# Patient Record
Sex: MALE | Race: WHITE | NOT HISPANIC OR LATINO | Employment: FULL TIME | URBAN - METROPOLITAN AREA
[De-identification: names, ages, dates, MRNs, and addresses within clinical notes are randomized per-mention and may not be internally consistent; named-entity substitution may affect disease eponyms.]

---

## 2017-03-09 ENCOUNTER — HOSPITAL ENCOUNTER (EMERGENCY)
Facility: HOSPITAL | Age: 57
Discharge: HOME | End: 2017-03-09
Attending: EMERGENCY MEDICINE | Admitting: EMERGENCY MEDICINE

## 2017-03-09 ENCOUNTER — APPOINTMENT (OUTPATIENT)
Dept: CT IMAGING | Facility: HOSPITAL | Age: 57
End: 2017-03-09

## 2017-03-09 VITALS
HEIGHT: 69 IN | TEMPERATURE: 97 F | SYSTOLIC BLOOD PRESSURE: 157 MMHG | OXYGEN SATURATION: 95 % | DIASTOLIC BLOOD PRESSURE: 86 MMHG | RESPIRATION RATE: 16 BRPM | HEART RATE: 63 BPM | WEIGHT: 240 LBS | BODY MASS INDEX: 35.55 KG/M2

## 2017-03-09 DIAGNOSIS — M54.50 ACUTE BILATERAL LOW BACK PAIN WITHOUT SCIATICA: Primary | ICD-10-CM

## 2017-03-09 LAB
ALBUMIN SERPL-MCNC: 4.5 G/DL (ref 3.5–5.2)
ALBUMIN/GLOB SERPL: 1.5 G/DL
ALP SERPL-CCNC: 58 U/L (ref 39–117)
ALT SERPL W P-5'-P-CCNC: 33 U/L (ref 1–41)
ANION GAP SERPL CALCULATED.3IONS-SCNC: 13.3 MMOL/L
AST SERPL-CCNC: 22 U/L (ref 1–40)
BASOPHILS # BLD AUTO: 0.04 10*3/MM3 (ref 0–0.2)
BASOPHILS NFR BLD AUTO: 0.7 % (ref 0–1.5)
BILIRUB SERPL-MCNC: 0.4 MG/DL (ref 0.1–1.2)
BILIRUB UR QL STRIP: NEGATIVE
BUN BLD-MCNC: 19 MG/DL (ref 6–20)
BUN/CREAT SERPL: 17.6 (ref 7–25)
CALCIUM SPEC-SCNC: 9.7 MG/DL (ref 8.6–10.5)
CHLORIDE SERPL-SCNC: 104 MMOL/L (ref 98–107)
CLARITY UR: CLEAR
CO2 SERPL-SCNC: 26.7 MMOL/L (ref 22–29)
COLOR UR: YELLOW
CREAT BLD-MCNC: 1.08 MG/DL (ref 0.76–1.27)
DEPRECATED RDW RBC AUTO: 43.8 FL (ref 37–54)
EOSINOPHIL # BLD AUTO: 0.21 10*3/MM3 (ref 0–0.7)
EOSINOPHIL NFR BLD AUTO: 3.7 % (ref 0.3–6.2)
ERYTHROCYTE [DISTWIDTH] IN BLOOD BY AUTOMATED COUNT: 13.9 % (ref 11.5–14.5)
GFR SERPL CREATININE-BSD FRML MDRD: 71 ML/MIN/1.73
GLOBULIN UR ELPH-MCNC: 3 GM/DL
GLUCOSE BLD-MCNC: 83 MG/DL (ref 65–99)
GLUCOSE UR STRIP-MCNC: NEGATIVE MG/DL
HCT VFR BLD AUTO: 46.3 % (ref 40.4–52.2)
HGB BLD-MCNC: 15.7 G/DL (ref 13.7–17.6)
HGB UR QL STRIP.AUTO: NEGATIVE
HOLD SPECIMEN: NORMAL
IMM GRANULOCYTES # BLD: 0 10*3/MM3 (ref 0–0.03)
IMM GRANULOCYTES NFR BLD: 0 % (ref 0–0.5)
KETONES UR QL STRIP: NEGATIVE
LEUKOCYTE ESTERASE UR QL STRIP.AUTO: NEGATIVE
LIPASE SERPL-CCNC: 27 U/L (ref 13–60)
LYMPHOCYTES # BLD AUTO: 1.57 10*3/MM3 (ref 0.9–4.8)
LYMPHOCYTES NFR BLD AUTO: 27.6 % (ref 19.6–45.3)
MCH RBC QN AUTO: 29.3 PG (ref 27–32.7)
MCHC RBC AUTO-ENTMCNC: 33.9 G/DL (ref 32.6–36.4)
MCV RBC AUTO: 86.5 FL (ref 79.8–96.2)
MONOCYTES # BLD AUTO: 0.43 10*3/MM3 (ref 0.2–1.2)
MONOCYTES NFR BLD AUTO: 7.6 % (ref 5–12)
NEUTROPHILS # BLD AUTO: 3.44 10*3/MM3 (ref 1.9–8.1)
NEUTROPHILS NFR BLD AUTO: 60.4 % (ref 42.7–76)
NITRITE UR QL STRIP: NEGATIVE
PH UR STRIP.AUTO: 5.5 [PH] (ref 5–8)
PLATELET # BLD AUTO: 167 10*3/MM3 (ref 140–500)
PMV BLD AUTO: 11.7 FL (ref 6–12)
POTASSIUM BLD-SCNC: 4.1 MMOL/L (ref 3.5–5.2)
PROT SERPL-MCNC: 7.5 G/DL (ref 6–8.5)
PROT UR QL STRIP: NEGATIVE
RBC # BLD AUTO: 5.35 10*6/MM3 (ref 4.6–6)
SODIUM BLD-SCNC: 144 MMOL/L (ref 136–145)
SP GR UR STRIP: 1.03 (ref 1–1.03)
UROBILINOGEN UR QL STRIP: NORMAL
WBC NRBC COR # BLD: 5.69 10*3/MM3 (ref 4.5–10.7)
WHOLE BLOOD HOLD SPECIMEN: NORMAL

## 2017-03-09 PROCEDURE — 36415 COLL VENOUS BLD VENIPUNCTURE: CPT | Performed by: EMERGENCY MEDICINE

## 2017-03-09 PROCEDURE — 99283 EMERGENCY DEPT VISIT LOW MDM: CPT

## 2017-03-09 PROCEDURE — 80053 COMPREHEN METABOLIC PANEL: CPT | Performed by: EMERGENCY MEDICINE

## 2017-03-09 PROCEDURE — 74176 CT ABD & PELVIS W/O CONTRAST: CPT

## 2017-03-09 PROCEDURE — 83690 ASSAY OF LIPASE: CPT | Performed by: EMERGENCY MEDICINE

## 2017-03-09 PROCEDURE — 81003 URINALYSIS AUTO W/O SCOPE: CPT | Performed by: EMERGENCY MEDICINE

## 2017-03-09 PROCEDURE — 85025 COMPLETE CBC W/AUTO DIFF WBC: CPT | Performed by: EMERGENCY MEDICINE

## 2017-03-09 RX ORDER — IBUPROFEN 600 MG/1
600 TABLET ORAL EVERY 8 HOURS PRN
Qty: 30 TABLET | Refills: 0 | Status: SHIPPED | OUTPATIENT
Start: 2017-03-09

## 2017-03-09 RX ORDER — TESTOSTERONE 20.25 MG/1.25G
GEL TOPICAL
COMMUNITY

## 2017-03-09 RX ORDER — SODIUM CHLORIDE 0.9 % (FLUSH) 0.9 %
10 SYRINGE (ML) INJECTION AS NEEDED
Status: DISCONTINUED | OUTPATIENT
Start: 2017-03-09 | End: 2017-03-09 | Stop reason: HOSPADM

## 2017-03-09 NOTE — ED PROVIDER NOTES
EMERGENCY DEPARTMENT ENCOUNTER    CHIEF COMPLAINT  Chief Complaint: flank pain  History given by: patient  History limited by: nothing  Room Number: 21/21  PMD: Amor Gonzalez MD      HPI:  Pt is a 56 y.o. male who presents complaining of dull, intermittent bilateral flank pain x1 month that occasional radiates into his abdomen. Pt states the episodes of pain last ~30 minutes and there is nothing that worsens or improves his pain. Pt also c/o fatigue and nausea. Pt states he was seen at urgent care p[facility] and was advised to come to the ED for further evaluation. Pt denies fever, chills, dysuria, hematuria, and unexpected weight loss.     Duration:  1 month  Onset: gradual  Timing: intermittent  Location: bilateral flanks  Radiation: abdomen  Quality: dull  Intensity/Severity: moderate  Progression: unchanged  Associated Symptoms: fatigue, abdominal pain, nausea  Aggravating Factors: none  Alleviating Factors: none  Previous Episodes: none  Treatment before arrival: none    PAST MEDICAL HISTORY  Active Ambulatory Problems     Diagnosis Date Noted   • No Active Ambulatory Problems     Resolved Ambulatory Problems     Diagnosis Date Noted   • No Resolved Ambulatory Problems     Past Medical History   Diagnosis Date   • Hyperlipidemia    • Hypertension        PAST SURGICAL HISTORY  History reviewed. No pertinent past surgical history.    FAMILY HISTORY  History reviewed. No pertinent family history.    SOCIAL HISTORY  Social History     Social History   • Marital status:      Spouse name: N/A   • Number of children: N/A   • Years of education: N/A     Occupational History   • Not on file.     Social History Main Topics   • Smoking status: Never Smoker   • Smokeless tobacco: Not on file   • Alcohol use Yes      Comment: occasionally   • Drug use: Not on file   • Sexual activity: Not on file     Other Topics Concern   • Not on file     Social History Narrative       ALLERGIES  Review of patient's allergies  indicates no known allergies.    REVIEW OF SYSTEMS  Review of Systems   Constitutional: Positive for fatigue. Negative for chills and fever.   HENT: Negative for sore throat and trouble swallowing.    Eyes: Negative for visual disturbance.   Respiratory: Negative for cough and shortness of breath.    Cardiovascular: Negative for chest pain and leg swelling.   Gastrointestinal: Positive for abdominal pain (radiating from flank) and nausea. Negative for diarrhea and vomiting.   Endocrine: Negative.    Genitourinary: Positive for flank pain. Negative for decreased urine volume and frequency.   Musculoskeletal: Negative for neck pain.   Skin: Negative for rash.   Allergic/Immunologic: Negative.    Neurological: Negative for weakness and numbness.   Hematological: Negative.    Psychiatric/Behavioral: Negative.    All other systems reviewed and are negative.      PHYSICAL EXAM  ED Triage Vitals   Temp Heart Rate Resp BP SpO2   03/09/17 1017 03/09/17 1017 03/09/17 1017 03/09/17 1017 03/09/17 1017   97 °F (36.1 °C) 74 16 163/94 95 %      Temp src Heart Rate Source Patient Position BP Location FiO2 (%)   03/09/17 1017 03/09/17 1017 03/09/17 1017 03/09/17 1017 --   Tympanic Monitor Sitting Right arm        Physical Exam   Constitutional: He is oriented to person, place, and time and well-developed, well-nourished, and in no distress.   HENT:   Head: Normocephalic and atraumatic.   Eyes: EOM are normal. Pupils are equal, round, and reactive to light.   Neck: Normal range of motion. Neck supple.   Cardiovascular: Normal rate, regular rhythm and normal heart sounds.    Pulmonary/Chest: Effort normal and breath sounds normal. No respiratory distress.   Abdominal: Soft. Bowel sounds are normal. There is no tenderness. There is no rebound and no guarding.   Musculoskeletal: Normal range of motion. He exhibits no edema.   No spinal tenderness    Neurological: He is alert and oriented to person, place, and time. He has normal  sensation and normal strength. He has a normal Straight Leg Raise Test.   Reflex Scores:       Patellar reflexes are 2+ on the right side and 2+ on the left side.  Skin: Skin is warm and dry.   Psychiatric: Mood and affect normal.   Nursing note and vitals reviewed.      LAB RESULTS  Lab Results (last 24 hours)     Procedure Component Value Units Date/Time    POCT Urinalysis (manual dipstick) [[id number]]  (Abnormal) Collected:  03/09/17 0858    Specimen:  Urine Updated:  03/09/17 0903     Color Yellow      Clarity, UA Clear      Glucose,  mg/dL (A) mg/dL      Bilirubin Negative      Ketones, UA Negative      Specific Gravity  1.030      Blood, UA Negative      pH, Urine 5.0      Protein, POC Trace (A) mg/dL      Urobilinogen, UA Normal      Leukocytes Negative      Nitrite, UA Negative     CBC & Differential [[id number]] Collected:  03/09/17 1109    Specimen:  Blood Updated:  03/09/17 1124    Narrative:       The following orders were created for panel order CBC & Differential.  Procedure                               Abnormality         Status                     ---------                               -----------         ------                     CBC Auto Differential[[id number]]         Normal              Final result                 Please view results for these tests on the individual orders.    Comprehensive Metabolic Panel [[id number]] Collected:  03/09/17 1109    Specimen:  Blood Updated:  03/09/17 1143     Glucose 83 mg/dL      BUN 19 mg/dL      Creatinine 1.08 mg/dL      Sodium 144 mmol/L      Potassium 4.1 mmol/L      Chloride 104 mmol/L      CO2 26.7 mmol/L      Calcium 9.7 mg/dL      Total Protein 7.5 g/dL      Albumin 4.50 g/dL      ALT (SGPT) 33 U/L      AST (SGOT) 22 U/L      Alkaline Phosphatase 58 U/L      Total Bilirubin 0.4 mg/dL      eGFR Non African Amer 71 mL/min/1.73      Globulin 3.0 gm/dL      A/G Ratio 1.5 g/dL      BUN/Creatinine Ratio 17.6      Anion Gap 13.3 mmol/L     Lipase  [[id number]]  (Normal) Collected:  03/09/17 1109    Specimen:  Blood Updated:  03/09/17 1143     Lipase 27 U/L     CBC Auto Differential [[id number]]  (Normal) Collected:  03/09/17 1109    Specimen:  Blood Updated:  03/09/17 1124     WBC 5.69 10*3/mm3      RBC 5.35 10*6/mm3      Hemoglobin 15.7 g/dL      Hematocrit 46.3 %      MCV 86.5 fL      MCH 29.3 pg      MCHC 33.9 g/dL      RDW 13.9 %      RDW-SD 43.8 fl      MPV 11.7 fL      Platelets 167 10*3/mm3      Neutrophil % 60.4 %      Lymphocyte % 27.6 %      Monocyte % 7.6 %      Eosinophil % 3.7 %      Basophil % 0.7 %      Immature Grans % 0.0 %      Neutrophils, Absolute 3.44 10*3/mm3      Lymphocytes, Absolute 1.57 10*3/mm3      Monocytes, Absolute 0.43 10*3/mm3      Eosinophils, Absolute 0.21 10*3/mm3      Basophils, Absolute 0.04 10*3/mm3      Immature Grans, Absolute 0.00 10*3/mm3     Urinalysis With / Culture If Indicated [[id number]]  (Normal) Collected:  03/09/17 1137    Specimen:  Urine from Urine, Clean Catch Updated:  03/09/17 1202     Color, UA Yellow      Appearance, UA Clear      pH, UA 5.5      Specific Gravity, UA 1.029      Glucose, UA Negative      Ketones, UA Negative      Bilirubin, UA Negative      Blood, UA Negative      Protein, UA Negative      Leuk Esterase, UA Negative      Nitrite, UA Negative      Urobilinogen, UA 0.2 E.U./dL     Narrative:       Urine microscopic not indicated.      I ordered the above labs and reviewed the results    RADIOLOGY  CT Abdomen Pelvis Without Contrast   Final Result   No nephroureterolithiasis or hydronephrosis bilaterally.   There is equivocal haziness adjacent to the bladder base. Please   correlate clinically for acute cystitis.       Discussed with Dr. Mackey.       This report was finalized on 3/9/2017 2:48 PM by Dr. Dunia Smith MD.          I ordered the above noted radiological studies. Interpreted by radiologist. Discussed with radiologist. Reviewed by me in PACS.       PROCEDURES  Procedures      PROGRESS  AND CONSULTS  ED Course     11:37 AM  Ordered CT abd/pelvis for further evaluation.     12:15 PM  Pt rechecked and is resting comfortably. All pertinent lab/imaging/EKG findings discussed including normal labs and nothing acute seen on CT. Pt/family verbalized understanding and agree with plan to discharge with pain medication. All questions and concerns addressed at this time.       MEDICAL DECISION MAKING  Results were reviewed/discussed with the patient and they were also made aware of online access. Pt also made aware that some labs, such as cultures, will not be resulted during ER visit and follow up with PMD is necessary.     MDM  Number of Diagnoses or Management Options     Amount and/or Complexity of Data Reviewed  Clinical lab tests: ordered and reviewed (UA: Negative)  Tests in the radiology section of CPT®: ordered and reviewed (CT abd/pelvis: No stone. Negative acute)  Discussion of test results with the performing providers: yes  Independent visualization of images, tracings, or specimens: yes    Patient Progress  Patient progress: stable         DIAGNOSIS  Final diagnoses:   Acute bilateral low back pain without sciatica       DISPOSITION  Disposition: Discharged.    Patient discharged in stable condition.    Reviewed implications of results, diagnosis, meds, responsibility to follow up, warning signs and symptoms of possible worsening, potential complications and reasons to return to ER.    Patient/Family voiced understanding of above instructions.    Discussed plan for discharge, as there is no emergent indication for admission.  Pt/family is agreeable and understands need for follow up and repeat testing.  Pt is aware that discharge does not mean that nothing is wrong but it indicates no emergency is present and they must continue care with follow-up as given below or physician of their choice.     FOLLOW-UP  Amor Gonzalez MD  [address]  [address]  [phone]    Schedule an appointment as soon as possible for a visit         Medication List      New Prescriptions          ibuprofen 600 MG tablet   Commonly known as:  ADVIL,MOTRIN   Take 1 tablet by mouth Every 8 (Eight) Hours As Needed for Mild Pain   (1-3).         Changed          albuterol 108 (90 BASE) MCG/ACT inhaler   Commonly known as:  PROVENTIL HFA;VENTOLIN HFA   Inhale 2 puffs Every 4 (Four) Hours As Needed for wheezing.   What changed:  when to take this         Stop          azithromycin 250 MG tablet   Commonly known as:  ZITHROMAX       NON FORMULARY       predniSONE 50 MG tablet   Commonly known as:  DELTASONE             Latest Documented Vital Signs:  As of 3:41 PM  BP- 157/86 HR- 63 Temp- 97 °F (36.1 °C) (Tympanic) O2 sat- 95%    --  Documentation assistance provided by estefani Watt for Dr. Key MD.  Information recorded by the scribe was done at my direction and has been verified and validated by me.          Zoraida Watt  03/09/17 3181       Car Mackey MD  03/09/17 3085

## 2017-03-09 NOTE — ED NOTES
Patient states for about 2 months has had left and right flank pain, right abdominal pain, and fatigue, sent here from [facility] for blood work and scans.      Katerin Jc RN  03/09/17 1015

## 2019-11-21 ENCOUNTER — TELEPHONE (OUTPATIENT)
Dept: URGENT CARE | Facility: CLINIC | Age: 59
End: 2019-11-21

## 2019-11-21 NOTE — TELEPHONE ENCOUNTER
Spoke to pt. Explained CXR read result. Pt understands instructions to f/u with PCP to discuss and have repeat CXR in 4-6 mos per CC, PA. Arnie

## 2020-01-20 ENCOUNTER — APPOINTMENT (OUTPATIENT)
Dept: GENERAL RADIOLOGY | Facility: HOSPITAL | Age: 60
End: 2020-01-20

## 2020-01-20 ENCOUNTER — APPOINTMENT (OUTPATIENT)
Dept: CT IMAGING | Facility: HOSPITAL | Age: 60
End: 2020-01-20

## 2020-01-20 ENCOUNTER — HOSPITAL ENCOUNTER (EMERGENCY)
Facility: HOSPITAL | Age: 60
Discharge: HOME | End: 2020-01-20
Attending: EMERGENCY MEDICINE | Admitting: EMERGENCY MEDICINE

## 2020-01-20 VITALS
BODY MASS INDEX: 37.89 KG/M2 | HEART RATE: 70 BPM | WEIGHT: 250 LBS | TEMPERATURE: 98.4 F | SYSTOLIC BLOOD PRESSURE: 169 MMHG | RESPIRATION RATE: 16 BRPM | DIASTOLIC BLOOD PRESSURE: 97 MMHG | OXYGEN SATURATION: 94 % | HEIGHT: 68 IN

## 2020-01-20 DIAGNOSIS — R55 SYNCOPE AND COLLAPSE: ICD-10-CM

## 2020-01-20 DIAGNOSIS — R05.9 COUGH: Primary | ICD-10-CM

## 2020-01-20 LAB
ALBUMIN SERPL-MCNC: 4.2 G/DL (ref 3.5–5.2)
ALBUMIN/GLOB SERPL: 1.9 G/DL
ALP SERPL-CCNC: 43 U/L (ref 39–117)
ALT SERPL W P-5'-P-CCNC: 28 U/L (ref 1–41)
ANION GAP SERPL CALCULATED.3IONS-SCNC: 11.5 MMOL/L (ref 5–15)
AST SERPL-CCNC: 17 U/L (ref 1–40)
BASOPHILS # BLD AUTO: 0.07 10*3/MM3 (ref 0–0.2)
BASOPHILS NFR BLD AUTO: 0.8 % (ref 0–1.5)
BILIRUB SERPL-MCNC: 0.3 MG/DL (ref 0.2–1.2)
BUN BLD-MCNC: 22 MG/DL (ref 6–20)
BUN/CREAT SERPL: 20.2 (ref 7–25)
CALCIUM SPEC-SCNC: 9.2 MG/DL (ref 8.6–10.5)
CHLORIDE SERPL-SCNC: 104 MMOL/L (ref 98–107)
CO2 SERPL-SCNC: 30.5 MMOL/L (ref 22–29)
CREAT BLD-MCNC: 1.09 MG/DL (ref 0.76–1.27)
D DIMER PPP FEU-MCNC: 0.31 MCGFEU/ML (ref 0–0.49)
DEPRECATED RDW RBC AUTO: 46.9 FL (ref 37–54)
EOSINOPHIL # BLD AUTO: 0.14 10*3/MM3 (ref 0–0.4)
EOSINOPHIL NFR BLD AUTO: 1.6 % (ref 0.3–6.2)
ERYTHROCYTE [DISTWIDTH] IN BLOOD BY AUTOMATED COUNT: 14.6 % (ref 12.3–15.4)
GFR SERPL CREATININE-BSD FRML MDRD: 70 ML/MIN/1.73
GLOBULIN UR ELPH-MCNC: 2.2 GM/DL
GLUCOSE BLD-MCNC: 105 MG/DL (ref 65–99)
HCT VFR BLD AUTO: 41.6 % (ref 37.5–51)
HGB BLD-MCNC: 13.7 G/DL (ref 13–17.7)
IMM GRANULOCYTES # BLD AUTO: 0.14 10*3/MM3 (ref 0–0.05)
IMM GRANULOCYTES NFR BLD AUTO: 1.6 % (ref 0–0.5)
LYMPHOCYTES # BLD AUTO: 2 10*3/MM3 (ref 0.7–3.1)
LYMPHOCYTES NFR BLD AUTO: 22.5 % (ref 19.6–45.3)
MCH RBC QN AUTO: 28.5 PG (ref 26.6–33)
MCHC RBC AUTO-ENTMCNC: 32.9 G/DL (ref 31.5–35.7)
MCV RBC AUTO: 86.7 FL (ref 79–97)
MONOCYTES # BLD AUTO: 0.75 10*3/MM3 (ref 0.1–0.9)
MONOCYTES NFR BLD AUTO: 8.4 % (ref 5–12)
NEUTROPHILS # BLD AUTO: 5.78 10*3/MM3 (ref 1.7–7)
NEUTROPHILS NFR BLD AUTO: 65.1 % (ref 42.7–76)
NRBC BLD AUTO-RTO: 0.1 /100 WBC (ref 0–0.2)
NT-PROBNP SERPL-MCNC: 97.9 PG/ML (ref 5–900)
PLATELET # BLD AUTO: 180 10*3/MM3 (ref 140–450)
PMV BLD AUTO: 11.2 FL (ref 6–12)
POTASSIUM BLD-SCNC: 3.9 MMOL/L (ref 3.5–5.2)
PROT SERPL-MCNC: 6.4 G/DL (ref 6–8.5)
RBC # BLD AUTO: 4.8 10*6/MM3 (ref 4.14–5.8)
SODIUM BLD-SCNC: 146 MMOL/L (ref 136–145)
TROPONIN T SERPL-MCNC: <0.01 NG/ML (ref 0–0.03)
WBC NRBC COR # BLD: 8.88 10*3/MM3 (ref 3.4–10.8)

## 2020-01-20 PROCEDURE — 80053 COMPREHEN METABOLIC PANEL: CPT | Performed by: NURSE PRACTITIONER

## 2020-01-20 PROCEDURE — 84484 ASSAY OF TROPONIN QUANT: CPT | Performed by: NURSE PRACTITIONER

## 2020-01-20 PROCEDURE — 93005 ELECTROCARDIOGRAM TRACING: CPT | Performed by: NURSE PRACTITIONER

## 2020-01-20 PROCEDURE — 71275 CT ANGIOGRAPHY CHEST: CPT

## 2020-01-20 PROCEDURE — 99283 EMERGENCY DEPT VISIT LOW MDM: CPT

## 2020-01-20 PROCEDURE — 93010 ELECTROCARDIOGRAM REPORT: CPT | Performed by: INTERNAL MEDICINE

## 2020-01-20 PROCEDURE — 94640 AIRWAY INHALATION TREATMENT: CPT

## 2020-01-20 PROCEDURE — 71045 X-RAY EXAM CHEST 1 VIEW: CPT

## 2020-01-20 PROCEDURE — 0 IOPAMIDOL PER 1 ML: Performed by: EMERGENCY MEDICINE

## 2020-01-20 PROCEDURE — 83880 ASSAY OF NATRIURETIC PEPTIDE: CPT | Performed by: NURSE PRACTITIONER

## 2020-01-20 PROCEDURE — 85379 FIBRIN DEGRADATION QUANT: CPT | Performed by: NURSE PRACTITIONER

## 2020-01-20 PROCEDURE — 85025 COMPLETE CBC W/AUTO DIFF WBC: CPT | Performed by: NURSE PRACTITIONER

## 2020-01-20 PROCEDURE — 94799 UNLISTED PULMONARY SVC/PX: CPT

## 2020-01-20 PROCEDURE — 70450 CT HEAD/BRAIN W/O DYE: CPT

## 2020-01-20 RX ORDER — DEXTROMETHORPHAN HYDROBROMIDE AND PROMETHAZINE HYDROCHLORIDE 15; 6.25 MG/5ML; MG/5ML
5 SYRUP ORAL 4 TIMES DAILY PRN
Qty: 118 ML | Refills: 0 | OUTPATIENT
Start: 2020-01-20 | End: 2021-12-13

## 2020-01-20 RX ORDER — IPRATROPIUM BROMIDE AND ALBUTEROL SULFATE 2.5; .5 MG/3ML; MG/3ML
3 SOLUTION RESPIRATORY (INHALATION) ONCE
Status: COMPLETED | OUTPATIENT
Start: 2020-01-20 | End: 2020-01-20

## 2020-01-20 RX ORDER — PREDNISONE 10 MG/1
10 TABLET ORAL DAILY
COMMUNITY
End: 2021-12-13

## 2020-01-20 RX ORDER — LEVOFLOXACIN 500 MG/1
500 TABLET, FILM COATED ORAL DAILY
COMMUNITY
End: 2021-12-13

## 2020-01-20 RX ORDER — BUDESONIDE AND FORMOTEROL FUMARATE DIHYDRATE 80; 4.5 UG/1; UG/1
2 AEROSOL RESPIRATORY (INHALATION)
COMMUNITY

## 2020-01-20 RX ORDER — SODIUM CHLORIDE 0.9 % (FLUSH) 0.9 %
10 SYRINGE (ML) INJECTION AS NEEDED
Status: DISCONTINUED | OUTPATIENT
Start: 2020-01-20 | End: 2020-01-20 | Stop reason: HOSPADM

## 2020-01-20 RX ADMIN — IPRATROPIUM BROMIDE AND ALBUTEROL SULFATE 3 ML: 2.5; .5 SOLUTION RESPIRATORY (INHALATION) at 12:49

## 2020-01-20 RX ADMIN — IOPAMIDOL 95 ML: 755 INJECTION, SOLUTION INTRAVENOUS at 14:10

## 2020-01-20 RX ADMIN — SODIUM CHLORIDE 1000 ML: 9 INJECTION, SOLUTION INTRAVENOUS at 12:37

## 2020-01-20 NOTE — ED PROVIDER NOTES
Pt presents to the ED c/o cough that began 3-4 months ago. Pt is also c/o 3 episodes of syncope while coughing over the past month. He reports last night he had a mild cough and passed out, causing him to hit his head on kitchen cabinet. Pt denies headache, nausea, vomiting, numbness and BLE swelling. He reports being seen 4 x total (2 ICC and 2 PCP) for cough and was diagnosed with bronchitis. He reports using nebulizer machine at home with minimal improvement. He denies hx of chronic lung disease and is not followed by pulmonology. Pt denies smoking.      On exam,   Cardiac- heart regular rate and rhythm without murmu  Respiratory- rhonchi and wheezes in upper L lung field  HENT- oropharynx normal   GI- abdomen soft, non-tender, non-distended with normal active bowel sounds  MSK- no calf tenderness or BLE edema, C-spine non-tender with full ROM       Plan: I agree with the plan to obtain ct head, cta chest and xr chest for further evaluation.       EKG          EKG time: 1234  Rhythm/Rate: Sinus rhythm, rate 74  P waves and MO: Nml  QRS, axis: Nml   ST and T waves: Nml     Interpreted Contemporaneously by me, independently viewed  No previous EKG for comparison      Attestation:  The [facility] and I have discussed this patient's history, physical exam, and treatment plan.  I have reviewed the documentation and personally had a face to face interaction with the patient. I affirm the documentation and agree with the treatment and plan.  The attached note describes my personal findings.       Documentation assistance provided by estefani Mcmillan for .  Information recorded by the estefani was done at my direction and has been verified and validated by me.              Tavia Mcmillan  01/20/20 1411       Car Mackey MD  01/20/20 7438

## 2020-01-20 NOTE — DISCHARGE INSTRUCTIONS
-Home to Rest. Take phenergam DM cough syrup as needed. Follow up with pulmonology Dr Ashlie dupont further evaluation    -Follow up with Amor Gonzalez -[phone] , call to make an appointment for ED follow up and further evaluation.     -Return to the ER for any worsening of your symptoms, fever, chest pain, dizziness, headache, weakness, fever, any new or other concerning symptoms.     We encourage all patients to follow up with your primary care provider for blood pressure recheck.  If you are a smoker, work on decreasing and stopping tobacco use. Follow up with your PCP to discuss medications that may assist in tobacco cessation if interested.

## 2020-01-20 NOTE — ED PROVIDER NOTES
EMERGENCY DEPARTMENT ENCOUNTER    Room Number:  41/41  Date seen:  1/20/2020  Time seen: 12:18 PM  PCP: Amor Gonzalez MD    HPI:  Chief complaint: Cough  Context:Rodrigo Vaughan is a 59 y.o. male who presents to the ED with c/o cough.  Reports he has a cough for 3 to 4 months.  He has been to the urgent care multiple times, he has been on Zithromax and Levaquin with no improvement.  He has had steroids as well with no improvement.  He has been diagnosed with bronchitis multiple times.  He was just seen by his primary care on Friday, he reports they were going to order a CAT scan for further evaluation, however the order did not go through.  He has started to pass out due to the coughing.  He has 3 episodes of syncope after coughing, most recent being last night.  He reports he is coughing very hard and then he just blackouts.  He did hit his head on a cabinet.  Denies any headache or head pain now.  Denies any chest pain or shortness of breath.  He does sometimes have yellow productive sputum, however denies any hemoptysis.  Denies any history of asthma, COPD, heart failure, MI, DVT/PE. Pt denies DVT/PE/emboli risk factors such as recent surgery, hormone therapy, smoking, cancer, personal or family history of DVT/PE, long periods of being sedentary or recent long travel.    MEDICAL RECORD REVIEW  Chest x-ray on 11/20/2019 shows tiny nodular density to the left lung  Urgent care visits on 10/23, 11/20 bronchitis      ALLERGIES  Patient has no known allergies.    PAST MEDICAL HISTORY  Active Ambulatory Problems     Diagnosis Date Noted   • No Active Ambulatory Problems     Resolved Ambulatory Problems     Diagnosis Date Noted   • No Resolved Ambulatory Problems     Past Medical History:   Diagnosis Date   • Hyperlipidemia    • Hypertension        PAST SURGICAL HISTORY  No past surgical history on file.    FAMILY HISTORY  No family history on file.    SOCIAL HISTORY  Social History     Socioeconomic History   •  Marital status:      Spouse name: Not on file   • Number of children: Not on file   • Years of education: Not on file   • Highest education level: Not on file   Tobacco Use   • Smoking status: Never Smoker   Substance and Sexual Activity   • Alcohol use: Yes     Comment: occasionally       REVIEW OF SYSTEMS  Review of Systems   Constitutional: Negative for chills and fever.   HENT: Negative.    Eyes: Negative.    Respiratory: Positive for cough and wheezing. Negative for shortness of breath.    Cardiovascular: Negative for chest pain.   Gastrointestinal: Negative for abdominal pain and vomiting.   Genitourinary: Negative for dysuria and hematuria.   Musculoskeletal: Negative.    Skin: Negative for rash.   Neurological: Positive for syncope. Negative for headaches.   Psychiatric/Behavioral: Negative.  Negative for confusion.       PHYSICAL EXAM  ED Triage Vitals   Temp Heart Rate Resp BP SpO2   01/20/20 1125 01/20/20 1125 01/20/20 1144 01/20/20 1144 01/20/20 1125   98.4 °F (36.9 °C) 92 18 (!) 173/106 93 %      Temp src Heart Rate Source Patient Position BP Location FiO2 (%)   01/20/20 1125 01/20/20 1144 01/20/20 1144 01/20/20 1144 --   Tympanic Monitor Lying Right arm      Physical Exam   Constitutional: He is oriented to person, place, and time and well-developed, well-nourished, and in no distress. No distress.   HENT:   Head: Normocephalic and atraumatic.   Mouth/Throat: Mucous membranes are normal.   Eyes: Pupils are equal, round, and reactive to light.   Neck: Normal range of motion. Neck supple.   Cardiovascular: Normal rate, regular rhythm and normal heart sounds.   Pulmonary/Chest: Effort normal. No respiratory distress. He has wheezes (faint) in the left middle field and the left lower field.   Abdominal: Soft. There is no tenderness. There is no rebound and no guarding.   Musculoskeletal:   No BLE edema or calf tenderness   Neurological: He is alert and oriented to person, place, and time. He has  normal strength.   Skin: Skin is warm, dry and intact.   Psychiatric: Mood, affect and judgment normal.   Nursing note and vitals reviewed.      LAB RESULTS  Recent Results (from the past 24 hour(s))   Comprehensive Metabolic Panel    Collection Time: 01/20/20 12:34 PM   Result Value Ref Range    Glucose 105 (H) 65 - 99 mg/dL    BUN 22 (H) 6 - 20 mg/dL    Creatinine 1.09 0.76 - 1.27 mg/dL    Sodium 146 (H) 136 - 145 mmol/L    Potassium 3.9 3.5 - 5.2 mmol/L    Chloride 104 98 - 107 mmol/L    CO2 30.5 (H) 22.0 - 29.0 mmol/L    Calcium 9.2 8.6 - 10.5 mg/dL    Total Protein 6.4 6.0 - 8.5 g/dL    Albumin 4.20 3.50 - 5.20 g/dL    ALT (SGPT) 28 1 - 41 U/L    AST (SGOT) 17 1 - 40 U/L    Alkaline Phosphatase 43 39 - 117 U/L    Total Bilirubin 0.3 0.2 - 1.2 mg/dL    eGFR Non African Amer 70 >60 mL/min/1.73    Globulin 2.2 gm/dL    A/G Ratio 1.9 g/dL    BUN/Creatinine Ratio 20.2 7.0 - 25.0    Anion Gap 11.5 5.0 - 15.0 mmol/L   Troponin    Collection Time: 01/20/20 12:34 PM   Result Value Ref Range    Troponin T <0.010 0.000 - 0.030 ng/mL   BNP    Collection Time: 01/20/20 12:34 PM   Result Value Ref Range    proBNP 97.9 5.0 - 900.0 pg/mL   D-dimer, Quantitative    Collection Time: 01/20/20 12:34 PM   Result Value Ref Range    D-Dimer, Quantitative 0.31 0.00 - 0.49 MCGFEU/mL   CBC Auto Differential    Collection Time: 01/20/20 12:34 PM   Result Value Ref Range    WBC 8.88 3.40 - 10.80 10*3/mm3    RBC 4.80 4.14 - 5.80 10*6/mm3    Hemoglobin 13.7 13.0 - 17.7 g/dL    Hematocrit 41.6 37.5 - 51.0 %    MCV 86.7 79.0 - 97.0 fL    MCH 28.5 26.6 - 33.0 pg    MCHC 32.9 31.5 - 35.7 g/dL    RDW 14.6 12.3 - 15.4 %    RDW-SD 46.9 37.0 - 54.0 fl    MPV 11.2 6.0 - 12.0 fL    Platelets 180 140 - 450 10*3/mm3    Neutrophil % 65.1 42.7 - 76.0 %    Lymphocyte % 22.5 19.6 - 45.3 %    Monocyte % 8.4 5.0 - 12.0 %    Eosinophil % 1.6 0.3 - 6.2 %    Basophil % 0.8 0.0 - 1.5 %    Immature Grans % 1.6 (H) 0.0 - 0.5 %    Neutrophils, Absolute 5.78 1.70 -  7.00 10*3/mm3    Lymphocytes, Absolute 2.00 0.70 - 3.10 10*3/mm3    Monocytes, Absolute 0.75 0.10 - 0.90 10*3/mm3    Eosinophils, Absolute 0.14 0.00 - 0.40 10*3/mm3    Basophils, Absolute 0.07 0.00 - 0.20 10*3/mm3    Immature Grans, Absolute 0.14 (H) 0.00 - 0.05 10*3/mm3    nRBC 0.1 0.0 - 0.2 /100 WBC       I ordered the above labs and reviewed the results    RADIOLOGY  CT Angiogram Chest   Final Result   There is no CT evidence of pulmonary embolism or other acute   process within the chest.       This report was finalized on 1/20/2020 2:53 PM by Dr. Moreno Llanes M.D.          CT Head Without Contrast   Preliminary Result   No evidence of fracture or hemorrhage.           Radiation dose reduction techniques were utilized, including automated   exposure control and exposure modulation based on body size.              XR Chest 1 View   Final Result   Negative.       This report was finalized on 1/20/2020 12:54 PM by Dr. Jason Bautista M.D.              I ordered the above noted radiological studies and reviewed the images on the PACS system.      MEDICATIONS GIVEN IN ER  Medications   sodium chloride 0.9 % flush 10 mL (has no administration in time range)   sodium chloride 0.9 % bolus 1,000 mL (0 mL Intravenous Stopped 1/20/20 1637)   ipratropium-albuterol (DUO-NEB) nebulizer solution 3 mL (3 mL Nebulization Given 1/20/20 1249)   iopamidol (ISOVUE-370) 76 % injection 100 mL (95 mL Intravenous Given by Other 1/20/20 1410)     PROCEDURES  Procedures      PROGRESS AND CONSULTS    Progress Notes:         1215: Discussed the patient and plan of care with Dr. Mackey. After a bedside evaluation; they agree with the plan of care.    1645: Patient rechecked. Pt states that they feel improvement, no coughing at this time. Denies any chest pain, shortness of breath, feelings of syncope now. Discussed importance of FU with pulmonologist..  I discussed today's findings with the patient, explaining any pertinent positives  "and negatives from today's visit, and the plan of care.  I answered any of the patient's questions.  They were given appropriate follow-up with family physician and/or specialist. Patient is aware that discharge does not mean there is nothing wrong, it indicates no emergency is present and they must continue their care with a primary care provider and/or specialist. Given instructions for BP recheck with PCP. I advised them on when to return to the ED for further evaluation. The patient verbalized understanding. The patient's history, physical exam, and lab findings were discussed with the physician, who also performed a face to face history and physical exam. The patient was discharged in stable condition.         Disposition vitals:  /97   Pulse 70   Temp 98.4 °F (36.9 °C) (Tympanic)   Resp 16   Ht 172.7 cm (68\")   Wt 113 kg (250 lb)   SpO2 94%   BMI 38.01 kg/m²       DIAGNOSIS  Final diagnoses:   Cough   Syncope and collapse       FOLLOW UP   Amor Gonzalez MD  [address]  [place] [id number] [phone]    Schedule an appointment as soon as possible for a visit       Gucci Dailey MD  [address]  [place] [id number] [phone]    Schedule an appointment as soon as possible for a visit         RX     Medication List      Changed    promethazine-dextromethorphan 6.25-15 MG/5ML syrup  Commonly known as:  PROMETHAZINE-DM  Take 5 mL by mouth 4 (Four) Times a Day As Needed for Cough.  What changed:    medication strength  how much to take  when to take this  reasons to take this                 SalinaVal Pizarro, APRN  01/20/20 5982    "

## 2020-01-20 NOTE — ED TRIAGE NOTES
"Patient to Er with coughing for the last 4-5 months. Patient reported he coughing so much he is blacking out with this. Patient stated he hit his head last night after blacking out.\" reported no blood thinners in triage.   "

## 2021-12-13 ENCOUNTER — APPOINTMENT (OUTPATIENT)
Dept: GENERAL RADIOLOGY | Facility: HOSPITAL | Age: 61
End: 2021-12-13

## 2021-12-13 ENCOUNTER — HOSPITAL ENCOUNTER (EMERGENCY)
Facility: HOSPITAL | Age: 61
Discharge: HOME | End: 2021-12-13
Attending: EMERGENCY MEDICINE | Admitting: EMERGENCY MEDICINE

## 2021-12-13 VITALS
SYSTOLIC BLOOD PRESSURE: 99 MMHG | RESPIRATION RATE: 18 BRPM | TEMPERATURE: 97.8 F | DIASTOLIC BLOOD PRESSURE: 64 MMHG | OXYGEN SATURATION: 96 % | HEART RATE: 83 BPM

## 2021-12-13 DIAGNOSIS — U07.1 COVID-19: Primary | ICD-10-CM

## 2021-12-13 DIAGNOSIS — N28.9 RENAL INSUFFICIENCY: ICD-10-CM

## 2021-12-13 LAB
ALBUMIN SERPL-MCNC: 4.3 G/DL (ref 3.5–5.2)
ALBUMIN/GLOB SERPL: 1.5 G/DL
ALP SERPL-CCNC: 69 U/L (ref 39–117)
ALT SERPL W P-5'-P-CCNC: 72 U/L (ref 1–41)
ANION GAP SERPL CALCULATED.3IONS-SCNC: 12.7 MMOL/L (ref 5–15)
AST SERPL-CCNC: 44 U/L (ref 1–40)
BASOPHILS # BLD AUTO: 0.01 10*3/MM3 (ref 0–0.2)
BASOPHILS NFR BLD AUTO: 0.2 % (ref 0–1.5)
BILIRUB SERPL-MCNC: 0.4 MG/DL (ref 0–1.2)
BUN SERPL-MCNC: 24 MG/DL (ref 6–20)
BUN/CREAT SERPL: 13.1 (ref 7–25)
CALCIUM SPEC-SCNC: 9.2 MG/DL (ref 8.6–10.5)
CHLORIDE SERPL-SCNC: 102 MMOL/L (ref 98–107)
CO2 SERPL-SCNC: 27.3 MMOL/L (ref 22–29)
CREAT SERPL-MCNC: 1.83 MG/DL (ref 0.76–1.27)
DEPRECATED RDW RBC AUTO: 45.2 FL (ref 37–54)
EOSINOPHIL # BLD AUTO: 0.01 10*3/MM3 (ref 0–0.4)
EOSINOPHIL NFR BLD AUTO: 0.2 % (ref 0.3–6.2)
ERYTHROCYTE [DISTWIDTH] IN BLOOD BY AUTOMATED COUNT: 14.2 % (ref 12.3–15.4)
GFR SERPL CREATININE-BSD FRML MDRD: 38 ML/MIN/1.73
GLOBULIN UR ELPH-MCNC: 2.9 GM/DL
GLUCOSE SERPL-MCNC: 192 MG/DL (ref 65–99)
HCT VFR BLD AUTO: 43.4 % (ref 37.5–51)
HGB BLD-MCNC: 14.3 G/DL (ref 13–17.7)
HOLD SPECIMEN: NORMAL
HOLD SPECIMEN: NORMAL
IMM GRANULOCYTES # BLD AUTO: 0.02 10*3/MM3 (ref 0–0.05)
IMM GRANULOCYTES NFR BLD AUTO: 0.3 % (ref 0–0.5)
LIPASE SERPL-CCNC: 40 U/L (ref 13–60)
LYMPHOCYTES # BLD AUTO: 0.6 10*3/MM3 (ref 0.7–3.1)
LYMPHOCYTES NFR BLD AUTO: 10.4 % (ref 19.6–45.3)
MCH RBC QN AUTO: 28.7 PG (ref 26.6–33)
MCHC RBC AUTO-ENTMCNC: 32.9 G/DL (ref 31.5–35.7)
MCV RBC AUTO: 87 FL (ref 79–97)
MONOCYTES # BLD AUTO: 0.65 10*3/MM3 (ref 0.1–0.9)
MONOCYTES NFR BLD AUTO: 11.2 % (ref 5–12)
NEUTROPHILS NFR BLD AUTO: 4.5 10*3/MM3 (ref 1.7–7)
NEUTROPHILS NFR BLD AUTO: 77.7 % (ref 42.7–76)
NRBC BLD AUTO-RTO: 0 /100 WBC (ref 0–0.2)
PLATELET # BLD AUTO: 132 10*3/MM3 (ref 140–450)
PMV BLD AUTO: 11.9 FL (ref 6–12)
POTASSIUM SERPL-SCNC: 4 MMOL/L (ref 3.5–5.2)
PROT SERPL-MCNC: 7.2 G/DL (ref 6–8.5)
RBC # BLD AUTO: 4.99 10*6/MM3 (ref 4.14–5.8)
SARS-COV-2 RNA RESP QL NAA+PROBE: DETECTED
SODIUM SERPL-SCNC: 142 MMOL/L (ref 136–145)
WBC NRBC COR # BLD: 5.79 10*3/MM3 (ref 3.4–10.8)
WHOLE BLOOD HOLD SPECIMEN: NORMAL
WHOLE BLOOD HOLD SPECIMEN: NORMAL

## 2021-12-13 PROCEDURE — 85025 COMPLETE CBC W/AUTO DIFF WBC: CPT | Performed by: EMERGENCY MEDICINE

## 2021-12-13 PROCEDURE — 96374 THER/PROPH/DIAG INJ IV PUSH: CPT

## 2021-12-13 PROCEDURE — 71045 X-RAY EXAM CHEST 1 VIEW: CPT

## 2021-12-13 PROCEDURE — 25010000002 ONDANSETRON PER 1 MG: Performed by: PHYSICIAN ASSISTANT

## 2021-12-13 PROCEDURE — 99283 EMERGENCY DEPT VISIT LOW MDM: CPT

## 2021-12-13 PROCEDURE — 80053 COMPREHEN METABOLIC PANEL: CPT | Performed by: EMERGENCY MEDICINE

## 2021-12-13 PROCEDURE — U0003 INFECTIOUS AGENT DETECTION BY NUCLEIC ACID (DNA OR RNA); SEVERE ACUTE RESPIRATORY SYNDROME CORONAVIRUS 2 (SARS-COV-2) (CORONAVIRUS DISEASE [COVID-19]), AMPLIFIED PROBE TECHNIQUE, MAKING USE OF HIGH THROUGHPUT TECHNOLOGIES AS DESCRIBED BY CMS-2020-01-R: HCPCS | Performed by: PHYSICIAN ASSISTANT

## 2021-12-13 PROCEDURE — 36415 COLL VENOUS BLD VENIPUNCTURE: CPT

## 2021-12-13 PROCEDURE — 83690 ASSAY OF LIPASE: CPT | Performed by: PHYSICIAN ASSISTANT

## 2021-12-13 RX ORDER — ONDANSETRON 2 MG/ML
4 INJECTION INTRAMUSCULAR; INTRAVENOUS ONCE
Status: COMPLETED | OUTPATIENT
Start: 2021-12-13 | End: 2021-12-13

## 2021-12-13 RX ORDER — ONDANSETRON 4 MG/1
4 TABLET, ORALLY DISINTEGRATING ORAL 4 TIMES DAILY PRN
Qty: 15 TABLET | Refills: 0 | Status: SHIPPED | OUTPATIENT
Start: 2021-12-13

## 2021-12-13 RX ADMIN — SODIUM CHLORIDE 1000 ML: 9 INJECTION, SOLUTION INTRAVENOUS at 10:56

## 2021-12-13 RX ADMIN — ONDANSETRON 4 MG: 2 INJECTION INTRAMUSCULAR; INTRAVENOUS at 10:55

## 2021-12-13 NOTE — ED PROVIDER NOTES
I have supervised the care provided by the midlevel provider.    We have discussed this patient's history, physical exam, and treatment plan.   I have reviewed the note and have personally examined the patient and agree with the plan of care.  See attached attending note.  My personal findings are below:    Patient complains of general malaise, fatigue, nausea for the past 5 days.  Denies cough, fever, sore throat, shortness of breath, chest pain, abdominal pain, vomiting, or diarrhea.  He is not vaccinated for Covid.    On exam: Awake and alert.  Nontoxic-appearing.  Oropharynx is benign.  Heart is regular rate and rhythm.  Lungs are clear bilaterally.  Respiratory effort is normal.  Abdomen is soft and nontender.    Labs are pending.  Chest x-ray shows right basilar atelectasis.  Patient is getting IV fluids and IV Zofran.     Marcelino Ga MD  12/13/21 5083

## 2021-12-13 NOTE — ED NOTES
Patient to this ED via [facility] with c/o nausea and generalized body aches that started Thursday. .     Diya Howard RN  12/13/21 0912

## 2021-12-13 NOTE — DISCHARGE INSTRUCTIONS
Return to ER for any worsening symptoms or shortness of breath.  Drink plenty of fluids.      COVID-19 community information Self-quarantine instructions     What should I do if I’ve been told to self-quarantine?  The following instructions are provided to assist you to safely care for yourself or others who are infected or potentially infected with COVID-19. These instructions are also available at: https://www.cdc.gov/coronavirus/2019-ncov/hcp/guidance-prevent-spread.html.  Your health care provider and public health staff will evaluate whether you can be safely cared for at home. If it is determined that you do not need to be hospitalized and can be isolated at home, you will be monitored by staff from your local health department. You should follow the prevention steps below until a health care provider or local health department says you can return to your normal activities.  Stay home except to get medical care  You should restrict activities outside your home, except for getting medical care. Under no circumstance should you go to work, school, or public areas. Avoid using public transportation, ride sharing, or taxis.  Separate yourself from other people and animals in your home  People: As much as possible, you should stay in a specific room and away from other people in your home. Also, you should use a separate bathroom, if available.   Animals: You should restrict contact with pets and other animals while you are sick with COVID-19, just like you would around other people. Although there have not been reports of pets or other animals becoming sick with COVID-19, it is still recommended that people with COVID-19 limit contact with animals until more information is known about the virus. When possible, have another member of your household care for your animals while you are sick. If you must care for your pet or be around animals while you are sick, wash your hands before and after you interact with pets  and wear a facemask. See COVID-19 and Animals for more information.  Call ahead before visiting your doctor  If you have a medical appointment, call the health care provider prior to your appointment and tell them that you have or may have COVID-19. This will help the health care provider’s office take steps to keep other people from getting infected or exposed. Ask your health care provider to call the local or state health department. Persons who are placed under active monitoring or facilitated self-monitoring should follow instructions provided by their local health department or occupational health professionals, as appropriate. If you have a medical emergency and need to call 911, notify the dispatch personnel that you have, or are being evaluated for COVID-19. If possible, put on a facemask before emergency medical services arrive.  Take care of your mental health  You might be feeling anxious, afraid, lonely or uncertain. Download this guide for a list of helpful behavioral health resources, and a few tips for taking care of your emotional health while you're quarantined.   Wear a face mask  You should wear a facemask when you are around other people (e.g., sharing a room or vehicle) or pets, and before you enter a health care provider’s office. If you are not able to wear a facemask (for example, because it causes trouble breathing), then people who live with you should not stay in the same room with you, or they should wear a facemask if they enter your room.  Cover your coughs and sneezes  Cover your mouth and nose with a tissue when you cough or sneeze. Throw used tissues in a lined trash can.  Clean your hands often  Wash your hands often with soap and water for at least 20 seconds or clean your hands with an alcohol-based hand  that contains 60 to 95% alcohol, covering all surfaces of your hands and rubbing them together until they feel dry. Soap and water should be used preferentially if  hands are visibly dirty. Avoid touching your eyes, nose, and mouth with unwashed hands.  Clean all high-touch surfaces every day  Clean all high-touch surfaces daily. High-touch surfaces include counters, tabletops, doorknobs, bathroom fixtures, toilets, phones, keyboards, tablets, and bedside tables. Also, clean any surfaces that may have blood, stool, or body fluids on them. Use a household cleaning spray or wipe, according to the label instructions. Labels contain instructions for safe and effective use of the cleaning product including precautions you should take when applying the product, such as wearing gloves and making sure you have good ventilation during use of the product.  Avoid sharing personal household items  You should not share dishes, drinking glasses, cups, eating utensils, towels or bedding with other people or pets in your home. After using these items, they should be washed thoroughly with soap and water.  Monitor your symptoms  Please contact your local or state health department as soon as possible. Persons who are placed under active monitoring or facilitated self-monitoring should follow instructions provided by their local health department or occupational health professionals, as appropriate. View local health department contact information here.  Seek prompt medical attention if your illness is worsening (e.g., difficulty breathing). Before seeking care, call your health care provider and tell them that you have, or are being evaluated for COVID-19. Put on a facemask before you enter the facility. These steps will help the health care provider’s office to keep other people in the office or waiting room from getting infected or exposed.   If you have a medical emergency and need to call 911, notify the dispatch personnel that you have, or are being evaluated for COVID-19. If possible, put on a face mask before emergency medical services arrive.  Discontinuing home isolation  Patients with  confirmed COVID-19 should remain under home isolation precautions until the risk of secondary transmission to others is thought to be low. The decision to discontinue home isolation precautions is made on a case-by-case basis, in consultation with health care providers, and state and [facility] health departments.  If you are providing care for a person infected or suspected to be infected with COVID-19, please note the following. These instructions are also available at: https://www.cdc.gov/coronavirus/2019-ncov/hcp/guidance-prevent-spread.html.  How do I take care of someone who's quarantined in my home?  If you are providing care for a person infected or suspected to be infected with COVID-19, please note the following. These instructions are also available at CDC.gov.  Household members, intimate partners, and caregivers in a non-health care setting may have close contact (within 6 feet) with a person with symptomatic, laboratory-confirmed COVID-19 or a person under investigation. Those in close contact should monitor their health and should call their health care provider right away if they develop symptoms suggestive of COVID-19 (e.g., fever, cough, shortness of breath).   Those in close contacts should also follow these recommendations:  Make sure that you understand and can help the patient follow their health care provider’s instructions for medication(s) and care. You should help the patient with basic needs in the home and provide support for getting groceries, prescriptions and other personal needs   Monitor the patient’s symptoms. If the patient is getting sicker, call his or her health care provider and tell them that the patient has laboratory-confirmed or is under investigation for COVID-19. This will help the health care provider’s office take steps to keep other people in the office or waiting room from getting infected. Ask the health care provider to call the local or state health department for  additional guidance. If the patient has a medical emergency and you need to call 911, notify the dispatch personnel that the patient has, or is being evaluated for COVID-19   Household members should stay in another room or be  from the patient as much as possible. Household members should use a separate bedroom and bathroom, if available   Prohibit visitors who do not have an essential need to be in the home   Household members should care for any pets in the home. Do not handle pets or other animals while sick   Make sure that shared spaces in the home have good airflow, such as by an air conditioner or an opened window, weather permitting   Perform hand hygiene frequently. Wash your hands often with soap and water for at least 20 seconds or use an alcohol-based hand  that contains 60 to 95% alcohol, covering all surfaces of your hands and rubbing them together until they feel dry. Soap and water should be used preferentially if hands are visibly dirty   Avoid touching your eyes, nose, and mouth with unwashed hands   You and the patient should wear a facemask if you are in the same room   Wear a disposable facemask and gloves when you touch or have contact with the patient’s blood, stool, or body fluids, such as saliva, sputum, nasal mucus, vomit or urine.   Do not reuse disposable facemasks and gloves. Throw them away after using them.   When removing personal protective equipment, first remove and dispose of gloves. Then, immediately clean your hands with soap and water or alcohol-based hand . Next, remove and dispose of facemask, and immediately clean your hands again with soap and water or alcohol-based hand   Avoid sharing household items with the patient. You should not share dishes, drinking glasses, cups, eating utensils, towels, bedding or other items. After the patient uses these items, you should wash them thoroughly (see below “Wash laundry thoroughly”)   Clean all  high-touch surfaces, such as counters, tabletops, doorknobs, bathroom fixtures, toilets, phones, keyboards, tablets and bedside tables, every day. Also, clean any surfaces that may have blood, stool, or body fluids on them   Use a household cleaning spray or wipe, according to the label instructions. Labels contain instructions for safe and effective use of the cleaning product including precautions you should take when applying the product, such as wearing gloves and making sure you have good ventilation during the use of the product  Wash laundry thoroughly   Immediately remove and wash clothes or bedding that have blood, stool, or body fluids on them   Wear disposable gloves while handling soiled items and keep soiled items away from your body. Clean your hands (with soap and water or an alcohol-based hand ) immediately after removing and throwing away your gloves   Read and follow directions on labels of laundry or clothing items and detergent. In general, using a normal laundry detergent according to washing machine instructions and dry thoroughly using the warmest temperatures recommended on the clothing label  Place all used disposable gloves, facemasks, and other contaminated items in a lined container before disposing of them with other household waste. Clean your hands (with soap and water or an alcohol-based hand ) immediately after handling these items. Soap and water should be used preferentially if hands are visibly dirty   Discuss any additional questions with your state or local health department or health care provider  What if I live with someone who's been told to self-quarantine?  If the person you live with is NOT exhibiting respiratory symptoms, you can go about your day-to-day business, and you do not need to be tested or monitored.   If the person you live with is exhibiting respiratory symptoms, but has not yet tested positive for COVID-19:   Please make sure to stay home  "  Monitor your symptoms closely, and seek medical attention if your symptoms are worsening   Avoid public areas and public transportation   Wear a facemask if you are sick   Cover your coughs and sneezes with a tissue, dispose of the tissue and immediately wash your hands   Wash your hands often for at least 20 seconds, and if soap and water are not available, use hand    Avoid touching your eyes, nose or mouth   Avoid sharing personal household items   Clean \"high-touch\" surfaces daily  If the person you live with has tested positive for COVID-19, you will be considered a close contact, and will also likely be asked to self-quarantine.    "

## 2021-12-13 NOTE — ED PROVIDER NOTES
EMERGENCY DEPARTMENT ENCOUNTER    Room Number:  24/24  Date of encounter:  12/13/2021  PCP: Amor Gonzalez MD  Historian: Patient      I used full protective equipment while examining this patient.  This includes face mask, gloves and protective eyewear.  I washed my hands before entering the room and immediately upon leaving the room      HPI:  Chief Complaint: Multiple  A complete HPI/ROS/PMH/PSH/SH/FH are unobtainable due to: Nothing    Context: Rodrigo Vaughan is a 61 y.o. male who presents to the ED c/o 5-day history of gradual onset fatigue, nausea, myalgias, headache, malaise.  Patient denies any overt cough, shortness of breath, chest pain, fever, anosmia.  Patient is unvaccinated.  Patient went to the [facility] care center today.  He did test negative for influenza.  His Covid test is pending.  He was sent to the ER for further evaluation.  Patient denies any precipitating or alleviating factors.    Review of Medical Records  I reviewed patient's last office visit with his family medicine doctor.  Patient seen for hypertension, fatigue.    PAST MEDICAL HISTORY  Active Ambulatory Problems     Diagnosis Date Noted   • No Active Ambulatory Problems     Resolved Ambulatory Problems     Diagnosis Date Noted   • No Resolved Ambulatory Problems     Past Medical History:   Diagnosis Date   • Hyperlipidemia    • Hypertension          PAST SURGICAL HISTORY  History reviewed. No pertinent surgical history.      FAMILY HISTORY  History reviewed. No pertinent family history.      SOCIAL HISTORY  Social History     Socioeconomic History   • Marital status:    Tobacco Use   • Smoking status: Never Smoker   Substance and Sexual Activity   • Alcohol use: Yes     Comment: occasionally         ALLERGIES  Amlodipine        REVIEW OF SYSTEMS  All systems reviewed and negative except for those discussed in HPI.       PHYSICAL EXAM    I have reviewed the triage vital signs and nursing notes.    ED Triage Vitals  [12/13/21 0912]   Temp Heart Rate Resp BP SpO2   97.8 °F (36.6 °C) 90 18 92/65 95 %      Temp src Heart Rate Source Patient Position BP Location FiO2 (%)   -- -- -- -- --       Physical Exam  GENERAL: Alert, oriented, nontoxic-appearing, not distressed  HENT: head atraumatic, no nuchal rigidity  EYES: no scleral icterus, EOMI  CV: regular rhythm, regular rate, no murmur  RESPIRATORY: normal effort, CTA  ABDOMEN: soft, nontender  MUSCULOSKELETAL: no deformity, FROM, no calf swelling or tenderness  NEURO: alert, moves all extremities, follows commands  SKIN: warm, dry        LAB RESULTS  Recent Results (from the past 24 hour(s))   POCT Influenza A/B    Collection Time: 12/13/21  8:26 AM    Specimen: Swab   Result Value Ref Range    Rapid Influenza A Ag Negative Negative    Rapid Influenza B Ag Negative Negative    Internal Control Passed Passed    Lot Number [id number]     Expiration Date 12,[id number]    Comprehensive Metabolic Panel    Collection Time: 12/13/21 10:16 AM    Specimen: Blood   Result Value Ref Range    Glucose 192 (H) 65 - 99 mg/dL    BUN 24 (H) 6 - 20 mg/dL    Creatinine 1.83 (H) 0.76 - 1.27 mg/dL    Sodium 142 136 - 145 mmol/L    Potassium 4.0 3.5 - 5.2 mmol/L    Chloride 102 98 - 107 mmol/L    CO2 27.3 22.0 - 29.0 mmol/L    Calcium 9.2 8.6 - 10.5 mg/dL    Total Protein 7.2 6.0 - 8.5 g/dL    Albumin 4.30 3.50 - 5.20 g/dL    ALT (SGPT) 72 (H) 1 - 41 U/L    AST (SGOT) 44 (H) 1 - 40 U/L    Alkaline Phosphatase 69 39 - 117 U/L    Total Bilirubin 0.4 0.0 - 1.2 mg/dL    eGFR Non African Amer 38 (L) >60 mL/min/1.73    Globulin 2.9 gm/dL    A/G Ratio 1.5 g/dL    BUN/Creatinine Ratio 13.1 7.0 - 25.0    Anion Gap 12.7 5.0 - 15.0 mmol/L   Green Top (Gel)    Collection Time: 12/13/21 10:16 AM   Result Value Ref Range    Extra Tube Hold for add-ons.    Lavender Top    Collection Time: 12/13/21 10:16 AM   Result Value Ref Range    Extra Tube hold for add-on    Gold Top - SST    Collection Time: 12/13/21 10:16 AM    Result Value Ref Range    Extra Tube Hold for add-ons.    Light Blue Top    Collection Time: 12/13/21 10:16 AM   Result Value Ref Range    Extra Tube hold for add-on    CBC Auto Differential    Collection Time: 12/13/21 10:16 AM    Specimen: Blood   Result Value Ref Range    WBC 5.79 3.40 - 10.80 10*3/mm3    RBC 4.99 4.14 - 5.80 10*6/mm3    Hemoglobin 14.3 13.0 - 17.7 g/dL    Hematocrit 43.4 37.5 - 51.0 %    MCV 87.0 79.0 - 97.0 fL    MCH 28.7 26.6 - 33.0 pg    MCHC 32.9 31.5 - 35.7 g/dL    RDW 14.2 12.3 - 15.4 %    RDW-SD 45.2 37.0 - 54.0 fl    MPV 11.9 6.0 - 12.0 fL    Platelets 132 (L) 140 - 450 10*3/mm3    Neutrophil % 77.7 (H) 42.7 - 76.0 %    Lymphocyte % 10.4 (L) 19.6 - 45.3 %    Monocyte % 11.2 5.0 - 12.0 %    Eosinophil % 0.2 (L) 0.3 - 6.2 %    Basophil % 0.2 0.0 - 1.5 %    Immature Grans % 0.3 0.0 - 0.5 %    Neutrophils, Absolute 4.50 1.70 - 7.00 10*3/mm3    Lymphocytes, Absolute 0.60 (L) 0.70 - 3.10 10*3/mm3    Monocytes, Absolute 0.65 0.10 - 0.90 10*3/mm3    Eosinophils, Absolute 0.01 0.00 - 0.40 10*3/mm3    Basophils, Absolute 0.01 0.00 - 0.20 10*3/mm3    Immature Grans, Absolute 0.02 0.00 - 0.05 10*3/mm3    nRBC 0.0 0.0 - 0.2 /100 WBC   Lipase    Collection Time: 12/13/21 10:16 AM    Specimen: Blood   Result Value Ref Range    Lipase 40 13 - 60 U/L   COVID-19,[facility] IN-HOUSE CEPHEID/MISHA NP SWAB IN TRANSPORT MEDIA 8-12 HR TAT - Swab, Nasopharynx    Collection Time: 12/13/21 10:55 AM    Specimen: Nasopharynx; Swab   Result Value Ref Range    COVID19 Detected (C) Not Detected - Ref. Range       Ordered the above labs and independently reviewed the results.        RADIOLOGY  XR Chest 1 View    Result Date: 12/13/2021  EXAMINATION: SINGLE VIEW CHEST RADIOGRAPH  HISTORY: 61-year-old male with history of hypoxia.  FINDINGS: An AP chest radiograph was obtained and demonstrates lungs that are normal in volume and are clear of consolidation. There is mild crowding of the vascular markings at the base of the  right lung. The cardiomediastinal silhouette and osseous structures appear normal.      There is evidence of mild right lung base atelectasis.        I ordered the above noted radiological studies. Reviewed by me and discussed with radiologist.  See dictation for official radiology interpretation.      MEDICATIONS GIVEN IN ER    Medications   sodium chloride 0.9 % bolus 1,000 mL (0 mL Intravenous Stopped 12/13/21 1351)   ondansetron (ZOFRAN) injection 4 mg (4 mg Intravenous Given 12/13/21 1055)         PROGRESS, DATA ANALYSIS, CONSULTS, AND MEDICAL DECISION MAKING    All labs have been independently reviewed by me.  All radiology studies have been reviewed by me and discussed with radiologist dictating the report.   EKG's independently viewed and interpreted by me.  Discussion below represents my analysis of pertinent findings related to patient's condition, differential diagnosis, treatment plan and final disposition.    I have discussed case with Dr. aG, emergency room physician.  He has performed his own bedside examination and agrees with treatment plan.    ED Course as of 12/13/21 2002   Mon Dec 13, 2021   1025 Presents with 5-day history of nausea, weakness, fatigue, myalgias, headache.  Differential diagnosis include but not limited to influenza, COVID-19, other viral URI. [EE]   1031 WBC: 5.79 [EE]   1031 Platelets(!): 132 [EE]   1307 COVID19(!!): Detected [EE]   1308 ALT (SGPT)(!): 72 [EE]   1309 AST (SGOT)(!): 44 [EE]   1309 Patient with CoVid.  Patient denies any shortness of breath.  Patient's O2 sats fluctuate between 90 to 97% on room air. Patient is able to ambulate around the room without difficulty. He states he has not had any shortness of breath at all over the past several days. He appears stable.  We will discharge. We will send him home with a pulse oximeter. He understands return to the ER for any worsening symptoms or hypoxia. [EE]      ED Course User Index  [EE] Valentin Villela PA        AS OF 20:02 EST VITALS:    BP - 99/64  HR - 83  TEMP - 97.8 °F (36.6 °C)  O2 SATS - 96%        DIAGNOSIS  Final diagnoses:   COVID-19   Renal insufficiency         DISPOSITION  Discharged           Valentin Villela PA  12/13/21 2002